# Patient Record
Sex: MALE | Race: BLACK OR AFRICAN AMERICAN | Employment: FULL TIME | URBAN - METROPOLITAN AREA
[De-identification: names, ages, dates, MRNs, and addresses within clinical notes are randomized per-mention and may not be internally consistent; named-entity substitution may affect disease eponyms.]

---

## 2020-02-04 ENCOUNTER — HOSPITAL ENCOUNTER (EMERGENCY)
Age: 45
Discharge: HOME | End: 2020-02-04
Attending: EMERGENCY MEDICINE
Payer: SELF-PAY

## 2020-02-04 ENCOUNTER — APPOINTMENT (OUTPATIENT)
Dept: GENERAL RADIOLOGY | Age: 45
End: 2020-02-04
Attending: EMERGENCY MEDICINE
Payer: SELF-PAY

## 2020-02-04 VITALS
SYSTOLIC BLOOD PRESSURE: 142 MMHG | HEART RATE: 77 BPM | TEMPERATURE: 98.6 F | BODY MASS INDEX: 32.88 KG/M2 | HEIGHT: 66 IN | DIASTOLIC BLOOD PRESSURE: 97 MMHG | OXYGEN SATURATION: 97 % | RESPIRATION RATE: 18 BRPM | WEIGHT: 204.59 LBS

## 2020-02-04 DIAGNOSIS — S90.31XA CONTUSION OF RIGHT FOOT, INITIAL ENCOUNTER: Primary | ICD-10-CM

## 2020-02-04 PROCEDURE — 73630 X-RAY EXAM OF FOOT: CPT

## 2020-02-04 PROCEDURE — 99282 EMERGENCY DEPT VISIT SF MDM: CPT

## 2020-02-04 RX ORDER — IBUPROFEN 800 MG/1
800 TABLET ORAL
Qty: 20 TAB | Refills: 0 | Status: SHIPPED | OUTPATIENT
Start: 2020-02-04 | End: 2020-02-11

## 2020-02-04 RX ORDER — HYDROCODONE BITARTRATE AND ACETAMINOPHEN 5; 325 MG/1; MG/1
1 TABLET ORAL
Qty: 9 TAB | Refills: 0 | Status: SHIPPED | OUTPATIENT
Start: 2020-02-04 | End: 2020-02-07

## 2020-02-04 NOTE — LETTER
[address] 
[facility] EMERGENCY DEPT 
[address] [facility] [id number] [phone] Work/School Note Date: 2/4/2020 To Whom It May concern: 
 
Nancy Champion was seen and treated today in the emergency room by the following provider(s): 
Attending Provider: Ricarda Díaz MD 
Physician Assistant: BALTAZAR Spaulding. Nancy Champion may return to work on [id number]. Sincerely, BALTAZAR Douglass

## 2020-02-04 NOTE — ED NOTES
Discharge instructions given to patient by BALTAZAR Carlin. Verbalized understanding of instructions. Patient discharged without difficulty. Patient discharged in stable condition ambulatory accompanied by GF.

## 2020-02-04 NOTE — ED PROVIDER NOTES
EMERGENCY DEPARTMENT HISTORY AND PHYSICAL EXAM      Date: 2/4/2020  Patient Name: Christopher Hart    History of Presenting Illness     Chief Complaint   Patient presents with    Foot Swelling     Patient reports he dropped a dresser on his foot last night and took pain meds woke uo this am to swelling. History Provided By: Patient    HPI: Christopher Hart, 45 y.o. male presents ambulatory to the ED with cc of less than a day of 7 out of 10 constant, aching dorsal right foot pain that is worse with palpation and started when he accidentally dropped the edge of a dresser on his foot last night. He tells me he took some over-the-counter pain medicine however there has been no improvement. 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 There are no other complaints, changes, or physical findings at this time. PCP: None    Current Outpatient Medications   Medication Sig Dispense Refill    ibuprofen (MOTRIN) 800 mg tablet Take 1 Tab by mouth every eight (8) hours as needed for Pain for up to 7 days. 20 Tab 0    HYDROcodone-acetaminophen (NORCO) 5-325 mg per tablet Take 1 Tab by mouth every eight (8) hours as needed for Pain for up to 3 days. Max Daily Amount: 3 Tabs. 9 Tab 0     Past History     Past Medical History:  History reviewed. No pertinent past medical history. Past Surgical History:  History reviewed. No pertinent surgical history. Family History:  History reviewed. No pertinent family history. Social History:  Social History     Tobacco Use    Smoking status: Never Smoker    Smokeless tobacco: Never Used   Substance Use Topics    Alcohol use: Not Currently    Drug use: Not Currently       Allergies:  No Known Allergies  Review of Systems   Review of Systems   Constitutional: Negative for fatigue and fever. HENT: Negative for congestion, ear pain and rhinorrhea. Eyes: Negative for pain and redness. Respiratory: Negative for cough and wheezing. Cardiovascular: Negative for chest pain and palpitations. Gastrointestinal: Negative for abdominal pain, nausea and vomiting. Genitourinary: Negative for dysuria, frequency and urgency. Musculoskeletal: Negative for back pain, neck pain and neck stiffness. Dorsal right foot pain   Skin: Negative for rash and wound. Neurological: Negative for weakness, light-headedness, numbness and headaches. Physical Exam   Physical Exam  Vitals signs and nursing note reviewed. Constitutional:       General: He is not in acute distress. Appearance: He is well-developed. He is not toxic-appearing. HENT:      Head: Normocephalic and atraumatic. No right periorbital erythema or left periorbital erythema. Jaw: No trismus. Right Ear: External ear normal.      Left Ear: External ear normal.      Nose: Nose normal.      Mouth/Throat:      Pharynx: Uvula midline. Eyes:      General: No scleral icterus. Conjunctiva/sclera: Conjunctivae normal.      Pupils: Pupils are equal, round, and reactive to light. Neck:      Musculoskeletal: Full passive range of motion without pain and normal range of motion. Cardiovascular:      Rate and Rhythm: Normal rate and regular rhythm. Heart sounds: Normal heart sounds. Pulmonary:      Effort: Pulmonary effort is normal. No tachypnea, accessory muscle usage or respiratory distress. Breath sounds: Normal breath sounds. No decreased breath sounds or wheezing. Abdominal:      Palpations: Abdomen is soft. Abdomen is not rigid. Tenderness: There is no abdominal tenderness. There is no guarding. Musculoskeletal: Normal range of motion. Right foot: Tenderness present. Feet:       Comments: RIGHT FOOT:  No break in the skin  No redness  Tender contusion of the dorsal aspect of the right foot primarily over the distal first metatarsal   Skin:     Findings: No rash.    Neurological:      Mental Status: He is alert and oriented to person, place, and time. He is not disoriented. GCS: GCS eye subscore is 4. GCS verbal subscore is 5. GCS motor subscore is 6. Cranial Nerves: No cranial nerve deficit. Sensory: No sensory deficit. Psychiatric:         Speech: Speech normal.       Diagnostic Study Results     Labs -   No results found for this or any previous visit (from the past 12 hour(s)). Radiologic Studies -   XR FOOT RT MIN 3 V   Final Result   IMPRESSION: No acute abnormality. CT Results  (Last 48 hours)    None        CXR Results  (Last 48 hours)    None        Medical Decision Making   I am the first provider for this patient. I reviewed the vital signs, available nursing notes, past medical history, past surgical history, family history and social history. Vital Signs-Reviewed the patient's vital signs. Patient Vitals for the past 12 hrs:   Temp Pulse Resp BP SpO2   02/04/20 1308 98.6 °F (37 °C) 77 18 (!) 142/97 97 %       Pulse Oximetry Analysis - 97% on RA    Records Reviewed: Nursing Notes    Provider Notes (Medical Decision Making):   DDx: Fracture, contusion    ED Course:   Initial assessment performed. The patients presenting problems have been discussed, and they are in agreement with the care plan formulated and outlined with them. I have encouraged them to ask questions as they arise throughout their visit. Disposition:  Discharge    PLAN:  1. Discharge Medication List as of 2/4/2020  2:40 PM      START taking these medications    Details   ibuprofen (MOTRIN) 800 mg tablet Take 1 Tab by mouth every eight (8) hours as needed for Pain for up to 7 days. , Print, Disp-20 Tab, R-0      HYDROcodone-acetaminophen (NORCO) 5-325 mg per tablet Take 1 Tab by mouth every eight (8) hours as needed for Pain for up to 3 days. Max Daily Amount: 3 Tabs., Print, Disp-9 Tab, R-0           2.    Follow-up Information     Follow up With Specialties Details Why Contact Info    Marti Connell DPM Podiatry, Orthopedic Surgery Schedule an appointment as soon as possible for a visit PODIATRY: as needed [address] [facility] [place]  [phone]          Return to ED if worse     Diagnosis     Clinical Impression:   1.  Contusion of right foot, initial encounter

## 2021-02-23 ENCOUNTER — HOSPITAL ENCOUNTER (EMERGENCY)
Age: 46
Discharge: HOME | End: 2021-02-23
Attending: EMERGENCY MEDICINE

## 2021-02-23 ENCOUNTER — APPOINTMENT (OUTPATIENT)
Dept: GENERAL RADIOLOGY | Age: 46
End: 2021-02-23
Attending: PHYSICIAN ASSISTANT

## 2021-02-23 VITALS
DIASTOLIC BLOOD PRESSURE: 79 MMHG | BODY MASS INDEX: 33.49 KG/M2 | OXYGEN SATURATION: 94 % | TEMPERATURE: 98.3 F | HEART RATE: 82 BPM | SYSTOLIC BLOOD PRESSURE: 134 MMHG | WEIGHT: 201 LBS | HEIGHT: 65 IN | RESPIRATION RATE: 16 BRPM

## 2021-02-23 DIAGNOSIS — S39.012A BACK STRAIN, INITIAL ENCOUNTER: ICD-10-CM

## 2021-02-23 DIAGNOSIS — S20.222A CONTUSION OF LEFT SIDE OF BACK, INITIAL ENCOUNTER: Primary | ICD-10-CM

## 2021-02-23 PROCEDURE — 74011250636 HC RX REV CODE- 250/636: Performed by: PHYSICIAN ASSISTANT

## 2021-02-23 PROCEDURE — 99282 EMERGENCY DEPT VISIT SF MDM: CPT

## 2021-02-23 PROCEDURE — 72100 X-RAY EXAM L-S SPINE 2/3 VWS: CPT

## 2021-02-23 PROCEDURE — 96372 THER/PROPH/DIAG INJ SC/IM: CPT

## 2021-02-23 RX ORDER — LIDOCAINE 50 MG/G
PATCH TOPICAL
Qty: 5 EACH | Refills: 0 | Status: SHIPPED | OUTPATIENT
Start: 2021-02-23

## 2021-02-23 RX ORDER — KETOROLAC TROMETHAMINE 30 MG/ML
30 INJECTION, SOLUTION INTRAMUSCULAR; INTRAVENOUS
Status: COMPLETED | OUTPATIENT
Start: 2021-02-23 | End: 2021-02-23

## 2021-02-23 RX ORDER — CYCLOBENZAPRINE HCL 10 MG
10 TABLET ORAL
Qty: 15 TAB | Refills: 0 | Status: SHIPPED | OUTPATIENT
Start: 2021-02-23

## 2021-02-23 RX ORDER — DICLOFENAC SODIUM 75 MG/1
75 TABLET, DELAYED RELEASE ORAL 2 TIMES DAILY
Qty: 30 TAB | Refills: 0 | Status: SHIPPED | OUTPATIENT
Start: 2021-02-23

## 2021-02-23 RX ADMIN — KETOROLAC TROMETHAMINE 30 MG: 30 INJECTION, SOLUTION INTRAMUSCULAR; INTRAVENOUS at 13:39

## 2021-02-23 NOTE — Clinical Note
[facility] EMERGENCY DEPT 
[address] [facility] [address] 
[phone] Work/School Note Date: 2/23/2021 To Whom It May concern: 
 
Dipti Covington was seen and treated today in the emergency room by the following provider(s): 
Attending Provider: Juarez Mccord MD 
Physician Assistant: Danny Martinez PA-C. Dipti Covington is excused from work/school on 02/23/21 and 02/24/21. He is medically clear to return to work/school on 2/25/2021. Sincerely, Jah Lewis PA-C

## 2021-02-23 NOTE — ED PROVIDER NOTES
EMERGENCY DEPARTMENT HISTORY AND PHYSICAL EXAM      Date: 2/23/2021  Patient Name: Roberto Bowman    History of Presenting Illness     Chief Complaint   Patient presents with    Back Pain     History Provided By: Patient    HPI: Roberto Bowman, 39 y.o. male with no chronic medical history who presents via self to the ED with cc of acute moderate aching left greater than right low back pain X 1 week. Endorse that he did have a ground-level fall when slipping on the ice at his house a week ago, and landed on his low back. States the pain worsened when he went to work and was doing some heavy lifting this week. Over-the-counter naproxen and ibuprofen with minimal relief. Last dose of medications was earlier this morning. Denies fever, chills, nausea, vomiting, numbness, tingling, focal weakness, saddle paresthesias, gait abnormalities, hematuria, abdominal pain, chest pain, shortness of breath, incontinence, urinary retention. PCP: None    There are no other complaints, changes, or physical findings at this time. No current facility-administered medications on file prior to encounter. No current outpatient medications on file prior to encounter. Past History     Past Medical History:  History reviewed. No pertinent past medical history. Past Surgical History:  No past surgical history on file. Family History:  History reviewed. No pertinent family history. Social History:  Social History     Tobacco Use    Smoking status: Never Smoker    Smokeless tobacco: Never Used   Substance Use Topics    Alcohol use: Not Currently    Drug use: Not Currently     Allergies: Allergies   Allergen Reactions    Chicken Derived Hives    Gluten Hives    Pork/Porcine Containing Products Hives     Review of Systems   Review of Systems   Constitutional: Negative for activity change, chills, fatigue and fever. HENT: Negative. Eyes: Negative. Negative for photophobia, pain and visual disturbance. Respiratory: Negative. Negative for cough and shortness of breath. Cardiovascular: Negative for chest pain, palpitations and leg swelling. Gastrointestinal: Negative for abdominal pain, diarrhea, nausea and vomiting. Genitourinary: Negative for dysuria. Musculoskeletal: Positive for back pain. Negative for joint swelling, neck pain and neck stiffness. Skin: Negative. Negative for rash. Neurological: Negative. Negative for dizziness, tremors, seizures, syncope, facial asymmetry, speech difficulty, weakness, light-headedness, numbness and headaches. Psychiatric/Behavioral: Negative. Negative for confusion. Physical Exam   Physical Exam  Vitals signs and nursing note reviewed. Constitutional:       General: He is not in acute distress. Appearance: Normal appearance. He is well-developed. He is not ill-appearing, toxic-appearing or diaphoretic. HENT:      Head: Normocephalic and atraumatic. Right Ear: Hearing and external ear normal.      Left Ear: Hearing and external ear normal.      Nose: Nose normal.   Eyes:      Conjunctiva/sclera: Conjunctivae normal.      Pupils: Pupils are equal, round, and reactive to light. Neck:      Musculoskeletal: Normal range of motion and neck supple. Cardiovascular:      Rate and Rhythm: Normal rate and regular rhythm. Heart sounds: Normal heart sounds. Pulmonary:      Effort: Pulmonary effort is normal. No accessory muscle usage or respiratory distress. Abdominal:      General: Bowel sounds are normal.      Palpations: Abdomen is soft. Musculoskeletal:         General: Tenderness present. No deformity. Cervical back: Normal.      Thoracic back: Normal.      Lumbar back: He exhibits tenderness, bony tenderness and pain. He exhibits normal range of motion, no swelling, no edema, no deformity, no laceration, no spasm and normal pulse. Comments: No crepitus, deformity, step-off, edema, instability.   Full range of motion of lumbar spine. Skin:     General: Skin is warm and dry. Coloration: Skin is not pale. Neurological:      Mental Status: He is alert and oriented to person, place, and time. Cranial Nerves: No cranial nerve deficit. Psychiatric:         Speech: Speech normal.         Behavior: Behavior normal.         Thought Content: Thought content normal.         Judgment: Judgment normal.       Diagnostic Study Results   Labs -   No results found for this or any previous visit (from the past 12 hour(s)). Radiologic Studies -   XR SPINE LUMB 2 OR 3 V   Final Result   No acute abnormality. Xr Spine Lumb 2 Or 3 V    Result Date: 2/23/2021  No acute abnormality. Medical Decision Making   I am the first provider for this patient. I reviewed the vital signs, available nursing notes, past medical history, past surgical history, family history and social history. Vital Signs-Reviewed the patient's vital signs. Patient Vitals for the past 24 hrs:   Temp Pulse Resp BP SpO2   02/23/21 1212 98.3 °F (36.8 °C) 82 16 134/79 94 %     Pulse Oximetry Analysis - 94% on RA (normal)    Records Reviewed: Nursing Notes, Old Medical Records, Previous Radiology Studies and Previous Laboratory Studies    Provider Notes (Medical Decision Making): The patient complains of low back pain. These symptoms are consistent with a lumbar strain. Less likely  pathology, aortic dissection or ruptured AAA, or cauda equina given that there are no red flags and a benign physical exam.  Given patient's recent fall, will obtain imaging to rule out any fracture or subluxation. I have recommended rest, avoiding heavy lifting until better, use of intermittent heat (avoid sleeping on a heating pad), and use of OTC NSAID's (Advil, Aleve etc) or Tylenol prn for pain. Call PCP if back pain persists or he develops leg symptoms.   It has also been explained that this may take up to three months to fully resolved and that smoking may slow that process even more. ED Course:   Initial assessment performed. The patients presenting problems have been discussed, and they are in agreement with the care plan formulated and outlined with them. I have encouraged them to ask questions as they arise throughout their visit. Progress Note:   Updated pt on all returned results and findings. Discussed the importance of proper follow up as referred below along with return precautions. Pt in agreement with the care plan and expresses agreement with and understanding of all items discussed. Disposition:  1:47 PM  I have discussed with patient their diagnosis, treatment, and follow up plan. The patient agrees to follow up as outlined in discharge paperwork and also to return to the ED with any worsening. Jordana Frankel PA-C      PLAN:  1. Current Discharge Medication List      START taking these medications    Details   lidocaine (LIDODERM) 5 % Apply patch to the affected area for 12 hours a day and remove for 12 hours a day. Qty: 5 Each, Refills: 0      cyclobenzaprine (FLEXERIL) 10 mg tablet Take 1 Tab by mouth three (3) times daily as needed for Muscle Spasm(s). Qty: 15 Tab, Refills: 0      diclofenac EC (VOLTAREN) 75 mg EC tablet Take 1 Tab by mouth two (2) times a day. Qty: 30 Tab, Refills: 0           2. Follow-up Information     Follow up With Specialties Details Why Contact Info    [facility]  Schedule an appointment as soon as possible for a visit  As needed, If symptoms worsen [address]  [phone]        Return to ED if worse     Diagnosis     Clinical Impression:   1. Contusion of left side of back, initial encounter    2. Back strain, initial encounter            Please note that this dictation was completed with Dragon, computer voice recognition software.   Quite often unanticipated grammatical, syntax, homophones, and other interpretive errors are inadvertently transcribed by the computer software. Please disregard these errors. Additionally, please excuse any errors that have escaped final proofreading.

## 2024-04-03 ENCOUNTER — OFFICE VISIT (OUTPATIENT)
Age: 49
End: 2024-04-03

## 2024-04-03 VITALS
HEART RATE: 92 BPM | RESPIRATION RATE: 19 BRPM | TEMPERATURE: 98.6 F | OXYGEN SATURATION: 98 % | BODY MASS INDEX: 37.94 KG/M2 | SYSTOLIC BLOOD PRESSURE: 144 MMHG | WEIGHT: 228 LBS | DIASTOLIC BLOOD PRESSURE: 93 MMHG

## 2024-04-03 DIAGNOSIS — J02.9 SORE THROAT: Primary | ICD-10-CM

## 2024-04-03 LAB
GROUP A STREP ANTIGEN, POC: NEGATIVE
VALID INTERNAL CONTROL, POC: NORMAL

## 2024-04-03 RX ORDER — PREDNISONE 20 MG/1
40 TABLET ORAL DAILY
Qty: 6 TABLET | Refills: 0 | Status: SHIPPED | OUTPATIENT
Start: 2024-04-03 | End: 2024-04-06

## 2024-04-03 RX ORDER — ESCITALOPRAM OXALATE 20 MG/1
20 TABLET ORAL DAILY
COMMUNITY
Start: 2024-03-23

## 2024-04-03 NOTE — PATIENT INSTRUCTIONS
You were seen today for sore throat after possible ingestion of a small amount of fiberglass insulation  Strep test today is negative  Your vital signs are stable, physical exam is benign  Patient has no complaints of difficulty breathing or swallowing, no evidence of intestinal blockages  Will treat with 3 days of prednisone for sore throat  I would recommend calling poison control: [phone] and seeking their advise.  Please monitor symptoms carefully, any difficulty breathing or swallowing, hemoptysis, abdominal pain, vomiting, please go to the ED

## 2024-04-03 NOTE — PROGRESS NOTES
Duyen Ellis (:  1975) is a 49 y.o. male,New patient, here for evaluation of the following chief complaint(s):  Pharyngitis      ASSESSMENT/PLAN:  Visit Diagnoses and Associated Orders       Sore throat    -  Primary    AMB POC RAPID STREP A [20866 CPT(R)]           ORDERS WITHOUT AN ASSOCIATED DIAGNOSIS    escitalopram (LEXAPRO) 20 MG tablet [21376]              You were seen today for sore throat after possible ingestion of a small amount of fiberglass insulation  Strep test today is negative  Your vital signs are stable, physical exam is benign  Patient has no complaints of difficulty breathing or swallowing, no evidence of intestinal blockages  Will treat with 3 days of prednisone for sore throat  I would recommend calling poison control: [phone] and seeking their advise.  Please monitor symptoms carefully, any difficulty breathing or swallowing, hemoptysis, abdominal pain, vomiting, please go to the ED    SUBJECTIVE/OBJECTIVE:    Pharyngitis       49 y.o. male presents with symptoms of sore throat for the past 2 days. He reports some accompanying coughing but feels otherwise well. No fevers, chills, body aches or fatigue. Of note, patient states that he was working with some fiberglass insulation on Monday, 2 days ago and had a cup near by. After drinking from the cup, he wonders if any of the fiberglass insulation may have gotten into the cup as he began having sore throat shortly after that. He denies any difficulty breathing or swallowing. Denies any coughing up blood. Denies abdominal pain, nausea, vomiting, diarrhea or constipation. Pain in throat is intense /10. Denies drug allergies.         Vitals:    24 1209   BP: (!) 144/93   Site: Left Upper Arm   Position: Sitting   Cuff Size: Large Adult   Pulse: 92   Resp: 19   Temp: 98.6 °F (37 °C)   SpO2: 98%   Weight: 103.4 kg (228 lb)       Results for orders placed or performed in visit on 24   AMB POC RAPID STREP A   Result

## 2024-12-11 ENCOUNTER — APPOINTMENT (OUTPATIENT)
Facility: HOSPITAL | Age: 49
End: 2024-12-11
Payer: COMMERCIAL

## 2024-12-11 ENCOUNTER — HOSPITAL ENCOUNTER (EMERGENCY)
Facility: HOSPITAL | Age: 49
Discharge: HOME | End: 2024-12-11
Attending: EMERGENCY MEDICINE
Payer: COMMERCIAL

## 2024-12-11 VITALS
BODY MASS INDEX: 38.55 KG/M2 | OXYGEN SATURATION: 95 % | HEIGHT: 66 IN | DIASTOLIC BLOOD PRESSURE: 98 MMHG | RESPIRATION RATE: 16 BRPM | SYSTOLIC BLOOD PRESSURE: 151 MMHG | TEMPERATURE: 98.3 F | WEIGHT: 239.86 LBS | HEART RATE: 78 BPM

## 2024-12-11 DIAGNOSIS — M25.521 RIGHT ELBOW PAIN: Primary | ICD-10-CM

## 2024-12-11 PROCEDURE — 99283 EMERGENCY DEPT VISIT LOW MDM: CPT

## 2024-12-11 PROCEDURE — 73080 X-RAY EXAM OF ELBOW: CPT

## 2024-12-11 RX ORDER — PREDNISONE 50 MG/1
50 TABLET ORAL DAILY
Qty: 5 TABLET | Refills: 0 | Status: SHIPPED | OUTPATIENT
Start: 2024-12-11 | End: 2024-12-16

## 2024-12-11 ASSESSMENT — PAIN DESCRIPTION - DESCRIPTORS: DESCRIPTORS: SHARP;STABBING

## 2024-12-11 ASSESSMENT — PAIN DESCRIPTION - PAIN TYPE: TYPE: ACUTE PAIN

## 2024-12-11 ASSESSMENT — PAIN DESCRIPTION - ORIENTATION: ORIENTATION: RIGHT

## 2024-12-11 ASSESSMENT — PAIN - FUNCTIONAL ASSESSMENT: PAIN_FUNCTIONAL_ASSESSMENT: PREVENTS OR INTERFERES SOME ACTIVE ACTIVITIES AND ADLS

## 2024-12-11 ASSESSMENT — PAIN DESCRIPTION - ONSET: ONSET: GRADUAL

## 2024-12-11 ASSESSMENT — PAIN SCALES - GENERAL: PAINLEVEL_OUTOF10: 9

## 2024-12-11 ASSESSMENT — PAIN DESCRIPTION - FREQUENCY: FREQUENCY: CONTINUOUS

## 2024-12-11 ASSESSMENT — PAIN DESCRIPTION - LOCATION: LOCATION: ELBOW

## 2024-12-11 NOTE — ED TRIAGE NOTES
Pt arrived ambulatory to the ER with CC sharp, stabbing right elbow pain radiating to FA and shoulder. 9/10 pain. Hurts the most when lifting.     Denies injury, fall.

## 2024-12-11 NOTE — DISCHARGE INSTRUCTIONS
Discussed visit today. Try taking the medication at home. Follow-up with Ortho as needed.     Return to the ER with any worsening of symptoms.

## 2024-12-11 NOTE — ED PROVIDER NOTES
injury.  Patient is still wanting an x-ray so this was ordered. X-ray right elbow shows no acute abnormality.  Discussed results with the patient.  Will start patient on Voltaren gel as well as prednisone to help with the symptoms.  Rest advised.  Exercises provided.  Recommended to follow-up with Ortho for further evaluation.  Patient is in no acute distress and stable for discharge. Patient is agreeable to plan. All questions answered. Return precautions provided and discharged home at this time.       Problems Addressed:  Right elbow pain: acute illness or injury    Amount and/or Complexity of Data Reviewed  Radiology: ordered. Decision-making details documented in ED Course.    Risk  Prescription drug management.      Procedures    FINAL IMPRESSION      1. Right elbow pain          DISPOSITION/PLAN   DISPOSITION Decision To Discharge 12/11/2024 01:31:41 PM      PATIENT REFERRED TO:  [facility] EMERGENCY DEP  [address] [facility] [address] [phone]  Go to   As needed, If symptoms worsen    [facility]  [address] [place] [address] [address] [place] [address] [phone]  Schedule an appointment as soon as possible for a visit         DISCHARGE MEDICATIONS:  Discharge Medication List as of 12/11/2024  1:32 PM        START taking these medications    Details   diclofenac sodium (VOLTAREN) 1 % GEL Apply 2 g topically 4 times daily, Topical, 4 TIMES DAILY Starting Wed 12/11/2024, Disp-50 g, R-0, Normal      predniSONE (DELTASONE) 50 MG tablet Take 1 tablet by mouth daily for 5 days, Disp-5 tablet, R-0Normal           Controlled Substances Monitoring:          No data to display                (Please note that portions of this note were completed with a voice recognition program.  Efforts were made to edit the dictations but occasionally words are mis-transcribed.)    Vaughn Clarke PA-C (electronically signed)  Physician Assistant            Vaughn Clarke PA-C  12/11/24 4962